# Patient Record
Sex: MALE | Race: WHITE | URBAN - METROPOLITAN AREA
[De-identification: names, ages, dates, MRNs, and addresses within clinical notes are randomized per-mention and may not be internally consistent; named-entity substitution may affect disease eponyms.]

---

## 2017-03-20 ENCOUNTER — OFFICE VISIT (OUTPATIENT)
Dept: URGENT CARE | Facility: URGENT CARE | Age: 67
End: 2017-03-20

## 2017-03-20 VITALS
TEMPERATURE: 97.8 F | OXYGEN SATURATION: 100 % | DIASTOLIC BLOOD PRESSURE: 82 MMHG | HEART RATE: 77 BPM | WEIGHT: 139.3 LBS | SYSTOLIC BLOOD PRESSURE: 164 MMHG

## 2017-03-20 DIAGNOSIS — L03.113 CELLULITIS OF RIGHT UPPER EXTREMITY: ICD-10-CM

## 2017-03-20 DIAGNOSIS — L50.9 HIVES: Primary | ICD-10-CM

## 2017-03-20 PROCEDURE — 99203 OFFICE O/P NEW LOW 30 MIN: CPT | Performed by: PHYSICIAN ASSISTANT

## 2017-03-20 RX ORDER — HYDROXYZINE HYDROCHLORIDE 25 MG/1
25 TABLET, FILM COATED ORAL EVERY 6 HOURS PRN
Qty: 30 TABLET | Refills: 0 | Status: SHIPPED | OUTPATIENT
Start: 2017-03-20

## 2017-03-20 RX ORDER — SULFAMETHOXAZOLE/TRIMETHOPRIM 800-160 MG
1 TABLET ORAL 2 TIMES DAILY
Qty: 20 TABLET | Refills: 0 | Status: SHIPPED | OUTPATIENT
Start: 2017-03-20 | End: 2017-03-30

## 2017-03-20 RX ORDER — METHYLPREDNISOLONE 4 MG
TABLET, DOSE PACK ORAL
Qty: 21 TABLET | Refills: 0 | Status: SHIPPED | OUTPATIENT
Start: 2017-03-20

## 2017-03-20 ASSESSMENT — ENCOUNTER SYMPTOMS
DIARRHEA: 0
FEVER: 0
VOMITING: 0
CHILLS: 0
ABDOMINAL PAIN: 0
NAUSEA: 0
HEADACHES: 0
FOCAL WEAKNESS: 0
SHORTNESS OF BREATH: 0

## 2017-03-20 NOTE — PATIENT INSTRUCTIONS
Urticaria (adulto)  La urticaria es selam afección en la que hay protuberancias rosadas o rojizas en la piel. Estas protuberancias también se conocen christiano  ronchas . Las protuberancias pueden picar, arder o pinchar. Pueden aparecer en cualquier parte del cuerpo. Son de distinto tamaño y forma, y pueden aparecer en grupos. Selam roncha komal puede aparecer y desaparecer rápidamente. Puede que aparezcan nuevas ronchas cuando se van las anteriores. La urticaria (muchas ronchas) es algo común y no suele ser nada para preocuparse. En ocasiones, la urticaria puede ser un signo de selam alergia grave.  La urticaria suele deberse a selam reacción alérgica. Puede ser selam reacción alérgica a alimentos tales christiano frutas, mariscos, chocolate, nueces o tomates. Puede ser selam reacción al polen, al pelo de los animales o a las esporas de los hongos. Algunos medicamentos, productos químicos y picaduras de insectos también pueden causar urticaria. La urticaria puede deberse, asimismo, al sol kendrick o al aire frío. Puede ser difícil encontrar la causa de la urticaria.  Puede que le den medicamentos para aliviar la inflamación y la picazón. Siga todas las instrucciones para usar estos medicamentos. Las ronchas se irán en unos pocos días, inna pueden durar hasta dos semanas.  Cuidados en la casa  Meagan lo siguiente:    Intente encontrar la causa de jaimes urticaria y elimínela o evítela. Hable con jaimes proveedor de atención médica sobre las posibles causas. Las futuras reacciones al mismo alérgeno pueden ser peores.    No rasque las zonas con urticaria. Rascarse demorará la cicatrización. Para aliviar la picazón, aplíquese compresas frías y húmedas sobre la piel.    Vístase con ropa de algodón suave y liviana.    No se bañe con [place]. [place] puede empeorar la picazón.    Aplíquese sobre la piel selam compresa de hielo o selam compresa fría envuelta en selam toalla delgada. [place] ayudará a reducir el enrojecimiento y la picazón.    Pruebe a ponerse  selam crema o espray tópico con benzocaína para ayuda a reducir la picazón.    Use difenhidramina oral para ayudar a reducir la picazón. Es un antihistamínico que puede comprar en farmacias y tiendas de alimentos. Puede que le cause somnolencia. Por eso, use dosis más pequeñas christopher el día. También puede usar loratadina. Es un antihistamínico que no le provocará somnolencia. No use difenhidramina si tiene glaucoma o problemas al orinar por un agrandamiento de la próstata.  Visita de control  Programe selam visita de control con jaimes proveedor de atención médica si christian síntomas no mejoran en los siguientes dos días. Pregúntele a jaimes proveedor acerca de hacerse pruebas de alergia si tuvo alguna reacción grave o tuvo varios episodios de urticaria. Jaimes proveedor puede hacerle pruebas de alergia para kane a qué le tiene alergia.   Cuándo debe buscar atención médica?  Llame a jaimes proveedor de atención médica de inmediato si sucede cualquiera de las siguientes cosas:    Fiebre de 100.4  F (38.0  C) o más radha    Hinchazón de la lily, la lengua o la garganta    Dificultades para respirar o tragar    Enrojecimiento, inflamación o dolor    Líquido maloliente que sale de las ronchas    Mareo, debilidad o desmayo    1043-6864 [employer]. [address] [place] [address], [place] [address]. Todos los derechos reservados. Esta información no pretende sustituir la atención médica profesional. Sólo jaimes médico puede diagnosticar y tratar un problema de juventino.

## 2017-03-20 NOTE — NURSING NOTE
Chief Complaint   Patient presents with     Rash     itchy and painful rash x 5 months - arms, hands, legs, back - most recent flare x 1 week - went to [facility] clinic in [place] and got Prednisone once and it helped      Letter for School/Work     note for work        Initial /82 (BP Location: Left arm, Patient Position: Chair, Cuff Size: Adult Regular)  Pulse 77  Temp 97.8  F (36.6  C) (Oral)  Wt 139 lb 4.8 oz (63.2 kg)  SpO2 100% There is no height or weight on file to calculate BMI.  Medication Reconciliation: complete

## 2017-03-20 NOTE — LETTER
[facility]  [address] [address]  [phone]  Dept: [phone]      3/20/2017    Re: Toñito Villalobos      TO WHOM IT MAY CONCERN:    Toñito Villalobos was seen in clinic today. Please excuse him from work through 3/22/17.    Cordially    Gema Jimenez PA-C  [facility]

## 2017-03-20 NOTE — PROGRESS NOTES
HPI  March 20, 2017    HPI: Toñito Villalobos is a 67 year old male who complains of pruritic rash onset 1 week ago. Rash is generalized and on arms, legs, and trunk. Pt has been having similar rash intermittenly for past 5 months. It is not painful. Pt denies contacts with anyone with a similar rash. No new medications, soaps, detergents, lotions, foods, or other products. He was seen at a clinic in [place] a few months ago and given a short course of prednisone which provided temporary relief. Pt works in housekeeping at [employer], and also buses tables at a restaurant. Symptoms are moderate in severity & constant in duration. Denies throat/tongue swelling, CP, SOB, abd pain, N/VD, sore throat, and any other symptoms.    No past medical history on file.  No past surgical history on file.  Social History   Substance Use Topics     Smoking status: Current Some Day Smoker     Smokeless tobacco: Never Used      Comment: ~ 1 cigarette per week      Alcohol use Not on file       Problem list, Medication list, Allergies, and Medical/Social/Surgical histories reviewed in [facility] and updated as appropriate.        Review of Systems   Constitutional: Negative for chills and fever.   Respiratory: Negative for shortness of breath.    Cardiovascular: Negative for chest pain.   Gastrointestinal: Negative for abdominal pain, diarrhea, nausea and vomiting.   Skin: Positive for itching and rash.   Neurological: Negative for focal weakness and headaches.   All other systems reviewed and are negative.        Physical Exam   Constitutional: He is oriented to person, place, and time and well-developed, well-nourished, and in no distress.   HENT:   Head: Normocephalic and atraumatic.   Mouth/Throat: Uvula is midline, oropharynx is clear and moist and mucous membranes are normal. No oral lesions. No posterior oropharyngeal edema.   No involvement of mucus membranes   Cardiovascular: Normal rate, regular rhythm and normal heart sounds.     Pulmonary/Chest: Effort normal and breath sounds normal.   Musculoskeletal: Normal range of motion.   Neurological: He is alert and oriented to person, place, and time. Gait normal.   Skin: Skin is warm and dry. Rash noted. Rash is urticarial (diffuse to bilateral arms, legs, trunk).   R posterior forearm with marked bright red erythema and slight weeping of skin.    Nursing note and vitals reviewed.      Vital Signs  /82 (BP Location: Left arm, Patient Position: Chair, Cuff Size: Adult Regular)  Pulse 77  Temp 97.8  F (36.6  C) (Oral)  Wt 139 lb 4.8 oz (63.2 kg)  SpO2 100%     Diagnostic Test Results:  none     ASSESSMENT/PLAN      ICD-10-CM    1. Hives L50.9 ALLERGY/ASTHMA ADULT REFERRAL     methylPREDNISolone (MEDROL DOSEPAK) 4 MG tablet     hydrOXYzine (ATARAX) 25 MG tablet   2. Cellulitis of right upper extremity L03.113 sulfamethoxazole-trimethoprim (BACTRIM DS) 800-160 MG per tablet      Suspect urticaria- Rx Medrol and Atarax, referred to allergist.    Possible secondary infection/cellulitis of R forearm. Rx Bactrim. Return precautions given.      I have discussed any lab or imaging results, the patient's diagnosis, and my plan of treatment with the patient and/or family. Patient is aware to come back in if with worsening symptoms or if no relief despite treatment plan.  Patient voiced understanding and had no further questions.       Follow Up: Return if symptoms worsen or fail to improve.    ANNEMARIE Chery, PASavannaC  [facility]

## 2017-03-20 NOTE — MR AVS SNAPSHOT
After Visit Summary   3/20/2017    Toñito Villalobos    MRN: [id number]           Patient Information     Date Of Birth          1950        Visit Information        Provider Department      3/20/2017 3:00 PM Gema Jimenez PA-C [facility]        Today's Diagnoses     Hives    -  1    Cellulitis of right upper extremity          Care Instructions      Urticaria (adulto)  La urticaria es selam afección en la que hay protuberancias rosadas o rojizas en la piel. Estas protuberancias también se conocen christiano  ronchas . Las protuberancias pueden picar, arder o pinchar. Pueden aparecer en cualquier parte del cuerpo. Son de distinto tamaño y forma, y pueden aparecer en grupos. Selam roncha komal puede aparecer y desaparecer rápidamente. Puede que aparezcan nuevas ronchas cuando se van las anteriores. La urticaria (muchas ronchas) es algo común y no suele ser nada para preocuparse. En ocasiones, la urticaria puede ser un signo de selam alergia grave.  La urticaria suele deberse a selam reacción alérgica. Puede ser selam reacción alérgica a alimentos tales christiano frutas, mariscos, chocolate, nueces o tomates. Puede ser selam reacción al polen, al pelo de los animales o a las esporas de los hongos. Algunos medicamentos, productos químicos y picaduras de insectos también pueden causar urticaria. La urticaria puede deberse, asimismo, al sol kendrick o al aire frío. Puede ser difícil encontrar la causa de la urticaria.  Puede que le den medicamentos para aliviar la inflamación y la picazón. Siga todas las instrucciones para usar estos medicamentos. Las ronchas se irán en unos pocos días, inna pueden durar hasta dos semanas.  Cuidados en la casa  Meagan lo siguiente:    Intente encontrar la causa de jaimes urticaria y elimínela o evítela. Hable con jaimes proveedor de atención médica sobre las posibles causas. Las futuras reacciones al mismo alérgeno pueden ser peores.    No rasque las zonas con  urticaria. Rascarse demorará la cicatrización. Para aliviar la picazón, aplíquese compresas frías y húmedas sobre la piel.    Vístase con ropa de algodón suave y liviana.    No se bañe con [place]. [place] puede empeorar la picazón.    Aplíquese sobre la piel selam compresa de hielo o selam compresa fría envuelta en selam toalla delgada. [place] ayudará a reducir el enrojecimiento y la picazón.    Pruebe a ponerse selam crema o espray tópico con benzocaína para ayuda a reducir la picazón.    Use difenhidramina oral para ayudar a reducir la picazón. Es un antihistamínico que puede comprar en farmacias y tiendas de alimentos. Puede que le cause somnolencia. Por eso, use dosis más pequeñas christopher el día. También puede usar loratadina. Es un antihistamínico que no le provocará somnolencia. No use difenhidramina si tiene glaucoma o problemas al orinar por un agrandamiento de la próstata.  Visita de control  Programe selam visita de control con jaimes proveedor de atención médica si christian síntomas no mejoran en los siguientes dos días. Pregúntele a jaimes proveedor acerca de hacerse pruebas de alergia si tuvo alguna reacción grave o tuvo varios episodios de urticaria. Jaimes proveedor puede hacerle pruebas de alergia para kane a qué le tiene alergia.   Cuándo debe buscar atención médica?  Llame a jaimes proveedor de atención médica de inmediato si sucede cualquiera de las siguientes cosas:    Fiebre de 100.4  F (38.0  C) o más radha    Hinchazón de la lily, la lengua o la garganta    Dificultades para respirar o tragar    Enrojecimiento, inflamación o dolor    Líquido maloliente que sale de las ronchas    Mareo, debilidad o desmayo    [id number] [employer]. [address] [place] [address], [place] [address]. Todos los derechos reservados. Esta información no pretende sustituir la atención médica profesional. Sólo jaimes médico puede diagnosticar y tratar un problema de juventino.              Follow-ups after your visit        Additional Services      "ALLERGY/ASTHMA ADULT REFERRAL       Your provider has referred you to:   N: [facility] [phone]   [email or web address]    Please be aware that coverage of these services is subject to the terms and limitations of your health insurance plan.  Call member services at your health plan with any benefit or coverage questions.      Please bring the following with you to your appointment:    (1) Any X-Rays, CTs or MRIs which have been performed.  Contact the facility where they were done to arrange for  prior to your scheduled appointment.    (2) List of current medications  (3) This referral request   (4) Any documents/labs given to you for this referral                  Follow-up notes from your care team     Return if symptoms worsen or fail to improve.      Who to contact     If you have questions or need follow up information about today's clinic visit or your schedule please contact [facility] directly at [phone].  Normal or non-critical lab and imaging results will be communicated to you by [facility], letter or phone within 4 business days after the clinic has received the results. If you do not hear from us within 7 days, please contact the clinic through [facility] or phone. If you have a critical or abnormal lab result, we will notify you by phone as soon as possible.  Submit refill requests through [employer] or call your pharmacy and they will forward the refill request to us. Please allow 3 business days for your refill to be completed.          Additional Information About Your Visit        [facility] Information     [employer] lets you send messages to your doctor, view your test results, renew your prescriptions, schedule appointments and more. To sign up, go to [email or web address] . Click on \"Log in\" on the left side of the screen, which will take you to the Welcome page. Then click on \"Sign up Now\" on the right side of the " page.     You will be asked to enter the access code listed below, as well as some personal information. Please follow the directions to create your username and password.     Your access code is: [email or web address]  Expires: 2017  4:18 PM     Your access code will  in 90 days. If you need help or a new code, please call your [facility] clinic or [phone].        Care EveryWhere ID     This is your Care EveryWhere ID. This could be used by other organizations to access your [facility] medical records  [id number]        Your Vitals Were     Pulse Temperature Pulse Oximetry             77 97.8  F (36.6  C) (Oral) 100%          Blood Pressure from Last 3 Encounters:   17 164/82    Weight from Last 3 Encounters:   17 139 lb 4.8 oz (63.2 kg)              We Performed the Following     ALLERGY/ASTHMA ADULT REFERRAL          Today's Medication Changes          These changes are accurate as of: 3/20/17  4:18 PM.  If you have any questions, ask your nurse or doctor.               Start taking these medicines.        Dose/Directions    hydrOXYzine 25 MG tablet   Commonly known as:  ATARAX   Used for:  Hives   Started by:  Gema Jimenez PA-C        Dose:  25 mg   Take 1 tablet (25 mg) by mouth every 6 hours as needed for itching   Quantity:  30 tablet   Refills:  0       methylPREDNISolone 4 MG tablet   Commonly known as:  MEDROL DOSEPAK   Used for:  Hives   Started by:  Gema Jimenez PA-C        Follow package instructions   Quantity:  21 tablet   Refills:  0       sulfamethoxazole-trimethoprim 800-160 MG per tablet   Commonly known as:  BACTRIM DS   Used for:  Cellulitis of right upper extremity   Started by:  Gema Jimenez PA-C        Dose:  1 tablet   Take 1 tablet by mouth 2 times daily for 10 days   Quantity:  20 tablet   Refills:  0            Where to get your medicines      These medications were sent to [facility] [address]   [address] [place] [address]     Phone:  [phone]     hydrOXYzine 25 MG tablet    methylPREDNISolone 4 MG tablet    sulfamethoxazole-trimethoprim 800-160 MG per tablet                Primary Care Provider    None Specified       No primary provider on file.        Thank you!     Thank you for choosing [facility]  for your care. Our goal is always to provide you with excellent care. Hearing back from our patients is one way we can continue to improve our services. Please take a few minutes to complete the written survey that you may receive in the mail after your visit with us. Thank you!             Your Updated Medication List - Protect others around you: Learn how to safely use, store and throw away your medicines at www.disposemymeds.org.          This list is accurate as of: 3/20/17  4:18 PM.  Always use your most recent med list.                   Brand Name Dispense Instructions for use    hydrOXYzine 25 MG tablet    ATARAX    30 tablet    Take 1 tablet (25 mg) by mouth every 6 hours as needed for itching       methylPREDNISolone 4 MG tablet    MEDROL DOSEPAK    21 tablet    Follow package instructions       sulfamethoxazole-trimethoprim 800-160 MG per tablet    BACTRIM DS    20 tablet    Take 1 tablet by mouth 2 times daily for 10 days

## 2017-03-28 ENCOUNTER — TELEPHONE (OUTPATIENT)
Dept: URGENT CARE | Facility: URGENT CARE | Age: 67
End: 2017-03-28

## 2017-03-28 NOTE — TELEPHONE ENCOUNTER
Rash is not getting better.  When calling please ask for Bonnie (partner) [phone]. Ok to leave detailed msg.

## 2017-03-28 NOTE — PATIENT INSTRUCTIONS
Patient's Partner stated that they will go to the ER if needed.      Kayley Gibson, [facility]